# Patient Record
Sex: MALE | Race: WHITE | NOT HISPANIC OR LATINO | Employment: FULL TIME | URBAN - METROPOLITAN AREA
[De-identification: names, ages, dates, MRNs, and addresses within clinical notes are randomized per-mention and may not be internally consistent; named-entity substitution may affect disease eponyms.]

---

## 2017-01-13 ENCOUNTER — RECORDS - HEALTHEAST (OUTPATIENT)
Dept: LAB | Facility: CLINIC | Age: 57
End: 2017-01-13

## 2017-01-13 LAB
CHOLEST SERPL-MCNC: 188 MG/DL
FASTING STATUS PATIENT QL REPORTED: ABNORMAL
HDLC SERPL-MCNC: 35 MG/DL
LDLC SERPL CALC-MCNC: 94 MG/DL
TRIGL SERPL-MCNC: 295 MG/DL

## 2018-08-08 ENCOUNTER — RECORDS - HEALTHEAST (OUTPATIENT)
Dept: LAB | Facility: CLINIC | Age: 58
End: 2018-08-08

## 2018-08-09 LAB
ALBUMIN SERPL-MCNC: 4.3 G/DL (ref 3.5–5)
ALP SERPL-CCNC: 85 U/L (ref 45–120)
ALT SERPL W P-5'-P-CCNC: 26 U/L (ref 0–45)
ANION GAP SERPL CALCULATED.3IONS-SCNC: 12 MMOL/L (ref 5–18)
AST SERPL W P-5'-P-CCNC: 22 U/L (ref 0–40)
BASOPHILS # BLD AUTO: 0 THOU/UL (ref 0–0.2)
BASOPHILS NFR BLD AUTO: 0 % (ref 0–2)
BILIRUB SERPL-MCNC: 0.9 MG/DL (ref 0–1)
BUN SERPL-MCNC: 15 MG/DL (ref 8–22)
CALCIUM SERPL-MCNC: 9.9 MG/DL (ref 8.5–10.5)
CHLORIDE BLD-SCNC: 105 MMOL/L (ref 98–107)
CHOLEST SERPL-MCNC: 158 MG/DL
CO2 SERPL-SCNC: 22 MMOL/L (ref 22–31)
CREAT SERPL-MCNC: 1.03 MG/DL (ref 0.7–1.3)
EOSINOPHIL # BLD AUTO: 0.1 THOU/UL (ref 0–0.4)
EOSINOPHIL NFR BLD AUTO: 1 % (ref 0–6)
ERYTHROCYTE [DISTWIDTH] IN BLOOD BY AUTOMATED COUNT: 12.1 % (ref 11–14.5)
FASTING STATUS PATIENT QL REPORTED: ABNORMAL
GFR SERPL CREATININE-BSD FRML MDRD: >60 ML/MIN/1.73M2
GLUCOSE BLD-MCNC: 240 MG/DL (ref 70–125)
HCT VFR BLD AUTO: 45.1 % (ref 40–54)
HDLC SERPL-MCNC: 42 MG/DL
HGB BLD-MCNC: 15.1 G/DL (ref 14–18)
LDLC SERPL CALC-MCNC: 62 MG/DL
LYMPHOCYTES # BLD AUTO: 4.3 THOU/UL (ref 0.8–4.4)
LYMPHOCYTES NFR BLD AUTO: 42 % (ref 20–40)
MCH RBC QN AUTO: 30.6 PG (ref 27–34)
MCHC RBC AUTO-ENTMCNC: 33.5 G/DL (ref 32–36)
MCV RBC AUTO: 92 FL (ref 80–100)
MONOCYTES # BLD AUTO: 0.8 THOU/UL (ref 0–0.9)
MONOCYTES NFR BLD AUTO: 8 % (ref 2–10)
NEUTROPHILS # BLD AUTO: 5.1 THOU/UL (ref 2–7.7)
NEUTROPHILS NFR BLD AUTO: 50 % (ref 50–70)
PLATELET # BLD AUTO: 145 THOU/UL (ref 140–440)
PMV BLD AUTO: 12.2 FL (ref 8.5–12.5)
POTASSIUM BLD-SCNC: 4.6 MMOL/L (ref 3.5–5)
PROT SERPL-MCNC: 7.5 G/DL (ref 6–8)
RBC # BLD AUTO: 4.93 MILL/UL (ref 4.4–6.2)
SODIUM SERPL-SCNC: 139 MMOL/L (ref 136–145)
TRIGL SERPL-MCNC: 269 MG/DL
WBC: 10.3 THOU/UL (ref 4–11)

## 2018-08-10 LAB — HBA1C MFR BLD: 8.8 % (ref 4.2–6.1)

## 2023-09-14 ENCOUNTER — HOSPITAL ENCOUNTER (EMERGENCY)
Facility: HOSPITAL | Age: 63
Discharge: HOME | End: 2023-09-14
Attending: STUDENT IN AN ORGANIZED HEALTH CARE EDUCATION/TRAINING PROGRAM | Admitting: STUDENT IN AN ORGANIZED HEALTH CARE EDUCATION/TRAINING PROGRAM
Payer: COMMERCIAL

## 2023-09-14 VITALS
DIASTOLIC BLOOD PRESSURE: 71 MMHG | HEART RATE: 94 BPM | WEIGHT: 196 LBS | OXYGEN SATURATION: 95 % | TEMPERATURE: 97.6 F | RESPIRATION RATE: 18 BRPM | SYSTOLIC BLOOD PRESSURE: 131 MMHG

## 2023-09-14 DIAGNOSIS — S05.02XA ABRASION OF LEFT CORNEA, INITIAL ENCOUNTER: ICD-10-CM

## 2023-09-14 PROCEDURE — 99283 EMERGENCY DEPT VISIT LOW MDM: CPT

## 2023-09-14 PROCEDURE — 250N000009 HC RX 250: Performed by: STUDENT IN AN ORGANIZED HEALTH CARE EDUCATION/TRAINING PROGRAM

## 2023-09-14 RX ORDER — ERYTHROMYCIN 5 MG/G
OINTMENT OPHTHALMIC ONCE
Status: COMPLETED | OUTPATIENT
Start: 2023-09-14 | End: 2023-09-14

## 2023-09-14 RX ORDER — TETRACAINE HYDROCHLORIDE 5 MG/ML
1-2 SOLUTION OPHTHALMIC ONCE
Status: COMPLETED | OUTPATIENT
Start: 2023-09-14 | End: 2023-09-14

## 2023-09-14 RX ADMIN — FLUORESCEIN SODIUM 1 STRIP: 1 STRIP OPHTHALMIC at 14:35

## 2023-09-14 RX ADMIN — ERYTHROMYCIN 1 G: 5 OINTMENT OPHTHALMIC at 14:39

## 2023-09-14 RX ADMIN — TETRACAINE HYDROCHLORIDE 2 DROP: 5 SOLUTION OPHTHALMIC at 14:35

## 2023-09-14 NOTE — ED NOTES
Pt alert and oriented x4. Ambulated with a steady gait. Discharged to home with family. Pt in hurry, declined discharge vitals. Pt in no acute distress, respirations even and nonlabored.

## 2023-09-14 NOTE — ED PROVIDER NOTES
EMERGENCY DEPARTMENT ENCOUNTER      NAME: Jonah Henry  AGE: 63 year old male  YOB: 1960  MRN: [id number]  EVALUATION DATE & TIME: 9/14/2023  1:42 PM    PCP: Meredith Raman    ED PROVIDER: Matt Ventura MD      Chief Complaint   Patient presents with    Eye Problem         FINAL IMPRESSION:  No diagnosis found.      ED COURSE & MEDICAL DECISION MAKING:    Pertinent Labs & Imaging studies reviewed. (See chart for details)  63 year old male presents to the Emergency Department for evaluation of eye irritation.  Patient states he was driving last night with the window open thinks maybe got something in his eyeglasses been having left eye irritation and watering since then.  Woke up with increased discomfort and redness this morning.  Endorses generalized feeling of blurriness out of the left eye but no open diplopia.  No headaches.  No fevers.  No numbness or weakness in the arms or legs.  Does not wear contacts but does wear glasses.   patient does have conjunctival injection of the left eye has a somewhat remote history of cataract surgery.    On exam patient does have conjunctival injection of the left eye but no ciliary flush.  No hypopyon.  Pupils equal round reactive to light, no consensual photophobia.  Visual acuity 20/20 both eyes, 20/20 right eye, 20/25 left eye with corrective lenses.  Fluorescein exam shows a small area of increased uptake around the 6 o'clock position of the cornea.  Limbus, negative Kassie sign.  There also appears to be very small retained black speck underneath the upper eyelid.    Black speck was removed with a Q-tip and suspect corneal abrasion as cause of the patient's symptoms.  Will place on erythromycin ointment.  Signs and symptoms of worsening infection discussed return precautions given.  Otherwise patient plans to follow-up with eye doctor in about 3 days to ensure improvement in symptoms.     2:20 PM I met and evaluated the patient. Performed  Woods lamp procedure and foreign body removal procedure.    2:38 PM Discussed discharge with the patient at this time, he is agreeable with this plan.    Medical Decision Making    History:  Supplemental history from: Documented in chart, if applicable  External Record(s) reviewed: Documented in chart, if applicable.    Work Up:  Chart documentation includes differential considered and any EKGs or imaging independently interpreted by provider, where specified.  In additional to work up documented, I considered the following work up: Documented in chart, if applicable.    External consultation:  Discussion of management with another provider: Documented in chart, if applicable    Complicating factors:  Care impacted by chronic illness: Diabetes and Hypertension  Care affected by social determinants of health: N/A    Disposition considerations: Discharge. I prescribed additional prescription strength medication(s) as charted. See documentation for any additional details.       At the conclusion of the encounter I discussed the results of all of the tests and the disposition. The questions were answered. The patient or family acknowledged understanding and was agreeable with the care plan.         MEDICATIONS GIVEN IN THE EMERGENCY:  Medications   erythromycin (ROMYCIN) ophthalmic ointment (has no administration in time range)   tetracaine (PONTOCAINE) 0.5 % ophthalmic solution 1-2 drop (2 drops Both Eyes $Given 9/14/23 1435)   fluorescein (FUL-JOSE) ophthalmic strip 1 strip (1 strip Both Eyes $Given 9/14/23 1435)       NEW PRESCRIPTIONS STARTED AT TODAY'S ER VISIT  New Prescriptions    No medications on file          =================================================================    HPI    Patient information was obtained from: the patient    Use of : N/A       Jonah Henry is a 63 year old male with a pertinent history of HTN, and T2DM, who presents to this ED  for evaluation of an eye problem.    The  patient was driving down the highway with his windows down last night when he felt debris fly into his left eye. He has had redness and irritation of this eye since then. He notes that it immediately began watering at that time. Since then, he has had photophobia, foggy vision, and eye watering. The patient wears glasses at baseline, but does not use contacts. He had Lasik in 2016. The patient denies headache, fevers, and any other symptoms at this time.       REVIEW OF SYSTEMS   Refer to the [facility]    PAST MEDICAL HISTORY:  No past medical history on file.    PAST SURGICAL HISTORY:  No past surgical history on file.        CURRENT MEDICATIONS:    No current outpatient medications on file.      ALLERGIES:  No Known Allergies    FAMILY HISTORY:  No family history on file.    SOCIAL HISTORY:   Social History     Socioeconomic History    Marital status:        VITALS:  /71   Pulse 94   Temp 97.6  F (36.4  C)   Resp 18   Wt 88.9 kg (196 lb)   SpO2 95%     PHYSICAL EXAM    Constitutional: Well developed, Well nourished, NAD,  HENT: Normocephalic, Atraumatic,  mucous membranes moist, visual acuity 20/20 in right eye and 20/25 in left. Left eye conjunctival injection with no ciliary flush. Very small area of increased fluorescein uptake at the 6-7 o'clock position. Small <1mm speck on the conjunctiva of the superior eyelid.   Neck- trachea midline, No stridor.   Respiratory: Normal breath sounds, No respiratory distress, No wheezing, Speaks full sentences easily.    Cardiovascular: Normal heart rate, Regular rhythm,  No murmurs, No rubs, No gallops. Chest wall nontender.    Abdominal: Soft, No tenderness, No rebound or guarding.     Musculoskeletal: 2+ DP pulses. No edema. No cyanosis,   Integument: Warm, Dry, No erythema, No rash.   Neurologic: Alert & oriented x 3   Psychiatric: Affect normal, Judgment normal, Mood normal. Cooperative.      LAB:  All pertinent labs reviewed and interpreted.        RADIOLOGY:  Reviewed all pertinent imaging. Please see official radiology report.  No orders to display       PROCEDURES:     PROCEDURE: Woods lamp Exam   INDICATIONS: Evaluation for corneal abrasion   PROCEDURE PROVIDER: Dr Matt Ventura   SITE: left eye   CONSENT:  The risks, benefits and alternatives for this procedure were explained to the patient and verbally accepted.     MEDICATION: fluorescein stain and tetracaine   EXAM FINDINGS: Right Eye: Unremarkable  Left Eye: Very small several millimeter area of fluorescein uptake in the 6 to 7 o'clock position of the left eye   COMPLICATIONS: Patient tolerated procedure well, without complication       PROCEDURE: Occular Foreign Body Removal   INDICATIONS: Foreign Body   PROCEDURE PROVIDER: Dr Matt Ventura   SITE: left eye conjunctival superficial  peripheral   CONSENT:  The risks, benefits and alternatives for this procedure were explained to the patient and verbally accepted.     MEDICATION: Topical tetracaine   DESCRIPTION OF PROCEDURE: The area was identified with assistance of a   Pickard lamp.  After topical anesthetic took effect I proceeded to remove the foreign body with a Q-tip.         COMPLICATIONS: Patient tolerated procedure well, without complication          [facility] System Documentation:   CMS Diagnoses:              I, Paige Garcia, am serving as a scribe to document services personally performed by Matt Ventura MD based on my observation and the provider's statements to me. I, Matt Ventura MD, attest that Paige Garcia is acting in a scribe capacity, has observed my performance of the services and has documented them in accordance with my direction.    Matt Ventura MD  [facility] EMERGENCY DEPARTMENT  [address] [place] [id number]  [phone]        Matt Ventura MD  09/14/23 1524